# Patient Record
Sex: FEMALE | Race: WHITE | NOT HISPANIC OR LATINO | URBAN - METROPOLITAN AREA
[De-identification: names, ages, dates, MRNs, and addresses within clinical notes are randomized per-mention and may not be internally consistent; named-entity substitution may affect disease eponyms.]

---

## 2021-08-25 ENCOUNTER — TRANSFERRED RECORDS (OUTPATIENT)
Dept: HEALTH INFORMATION MANAGEMENT | Facility: CLINIC | Age: 56
End: 2021-08-25

## 2021-09-01 ENCOUNTER — TRANSFERRED RECORDS (OUTPATIENT)
Dept: HEALTH INFORMATION MANAGEMENT | Facility: CLINIC | Age: 56
End: 2021-09-01

## 2021-10-21 ENCOUNTER — TRANSFERRED RECORDS (OUTPATIENT)
Dept: HEALTH INFORMATION MANAGEMENT | Facility: CLINIC | Age: 56
End: 2021-10-21
Payer: COMMERCIAL

## 2021-10-28 ENCOUNTER — OFFICE VISIT (OUTPATIENT)
Dept: PLASTIC SURGERY | Facility: CLINIC | Age: 56
End: 2021-10-28
Payer: COMMERCIAL

## 2021-10-28 DIAGNOSIS — Z98.890 POSTOPERATIVE STATE: Primary | ICD-10-CM

## 2021-10-28 NOTE — PROGRESS NOTES
The patient's sutures were removed without difficulty. She is pleased with how her incision is healing. We reviewed post op care instructions and she purchased biocorneum to use in the future. She will follow up in 6 weeks in clinic.

## 2021-12-01 ENCOUNTER — OFFICE VISIT (OUTPATIENT)
Dept: PLASTIC SURGERY | Facility: CLINIC | Age: 56
End: 2021-12-01
Payer: COMMERCIAL

## 2021-12-01 DIAGNOSIS — Z98.890 POSTOPERATIVE STATE: Primary | ICD-10-CM

## 2021-12-01 NOTE — LETTER
12/1/2021       RE: Tracey Patrick  [address] [facility] [address]     Dear Colleague,    Thank you for referring your patient, Tracey Patrick, to the [facility]. Please see a copy of my visit note below.    This office note has been dictated.       Again, thank you for allowing me to participate in the care of your patient.      Sincerely,    RASHAD GUTIÉRREZ MD

## 2021-12-01 NOTE — LETTER
December 1, 2021  Re: Tracey Huy Patrick  1965    Dear Dr. Kelley,    Thank you so much for referring Tracey Son Marvareid to the [facility]. I had the pleasure of visiting with Tracey today.     Attached you will find a copy of my note. Please feel free to reach out to me with any questions, [phone].     This office note has been dictated.         Your trust in our practice and care is much appreciated.    Sincerely,  RASHAD GUTIÉRREZ MD

## 2021-12-01 NOTE — LETTER
12/1/2021       RE: Tracey Patrick  [address] [facility] [address]     Dear Colleague,    Thank you for referring your patient, Tracey Patrick, to the [facility] at [facility]. Please see a copy of my visit note below.    This office note has been dictated.     Service Date: 12/01/2021    REASON FOR VISIT: Tracey is back today.      PHYSICAL EXAMINATION: The area is healing quite nicely.  The everted edges stand a little bit proud, but are softening.  The incision line; however, otherwise looks good.      ASSESSMENT AND PLAN: I am going to let this mature further.  We talked about potential dermabrasion in this sebaceous area of the nose be required.  I reviewed with her the details of doing so and that it could be done as an office procedure.  For now, we are going to let the area mature.       Bob Huston MD

## 2021-12-01 NOTE — LETTER
12/1/2021       RE: Tracey Patrick  [address] [facility] [address]     Dear Colleague,    Thank you for referring your patient, Tracey Patrick, to the [facility] at [facility]. Please see a copy of my visit note below.    This office note has been dictated.     Service Date: 12/01/2021    REASON FOR VISIT: Tracey is back today.      PHYSICAL EXAMINATION: The area is healing quite nicely.  The everted edges stand a little bit proud, but are softening.  The incision line; however, otherwise looks good.      ASSESSMENT AND PLAN: I am going to let this mature further.  We talked about potential dermabrasion in this sebaceous area of the nose be required.  I reviewed with her the details of doing so and that it could be done as an office procedure.  For now, we are going to let the area mature.       Bob Huston MD

## 2021-12-03 NOTE — PROGRESS NOTES
Service Date: 2021    REASON FOR VISIT: Tracey is back today.      PHYSICAL EXAMINATION: The area is healing quite nicely.  The everted edges stand a little bit proud, but are softening.  The incision line; however, otherwise looks good.      ASSESSMENT AND PLAN: I am going to let this mature further.  We talked about potential dermabrasion in this sebaceous area of the nose be required.  I reviewed with her the details of doing so and that it could be done as an office procedure.  For now, we are going to let the area mature.       Bob Huston MD        D: 2021   T: 2021   MT: ms    Name:     TRACEY CALLOWAY  MRN:      [id number]        Account:      [id number]   :      1965           Service Date: 2021       Document: [id number]

## 2022-03-16 ENCOUNTER — OFFICE VISIT (OUTPATIENT)
Dept: PLASTIC SURGERY | Facility: CLINIC | Age: 57
End: 2022-03-16
Payer: COMMERCIAL

## 2022-03-16 DIAGNOSIS — Z98.890 POSTOPERATIVE STATE: Primary | ICD-10-CM

## 2022-03-16 NOTE — LETTER
3/16/2022       RE: Tracey Patrick  [address] [facility] [address]     Dear Colleague,    Thank you for referring your patient, Tracey Patrick, to the [facility]. Please see a copy of my visit note below.    This office note has been dictated.       Again, thank you for allowing me to participate in the care of your patient.      Sincerely,    RASHAD GUTIÉRREZ MD

## 2022-03-16 NOTE — LETTER
3/16/2022      RE: Tracey Patrick  [address] [facility] [address]       This office note has been dictated.     Service Date: 03/16/2022    Ms. Patrick is back today and would like to consider options for scar revision.  She notes some subtle depression of the scar up to the radix and a prominence at the caudal margin of the lateral leanna of the left lower lateral cartilage just lateral to the soft triangle.  There is an overlying erythematous blush in the area of surgery.      We talked about treatment options.  I could consider resurfacing of the scar at the radix and along the alar margin.  The caudal margin of the lower lateral cartilage is slightly prominent.  If the dermabrasion or resurfacing did not improve the contour in this area, I would consider an endonasal shaving or softening of that cartilage.  In fact, she has what I think is a very reasonable result.  She agrees.  From an anesthetic perspective, I think the erythema is more of a distraction than any scar contour issues.  I told her that working with Dr. Kelley for V-beam laser or any other resurfacing modalities are a consideration.  We also talked about laser resurfacing for contour improvement.  She is interested in dealing with both the erythema and the contour issues.  I would have her have the contour addressed first and then the erythema later.      She will reflect on our conversation.  This is certainly not going to be covered by her insurance.  I was candid with her in that regard.       Bob Huston M.D.           March 16, 2022      Elena Kelley M.D.   [facility]  [address] [place] [address]   [phone]      Dear Dr. Kelley,    I saw Tracey Woodard today.  She has a bit of an erythematous blush to the dorsum of her nose which is not surprising given her sun damage history.  The scar has a very subtle indentation at the radix and the left ala region.  She could  have that resurfaced.  Again, it is subtle and I think the amount of change that we can make is somewhat limited.  If she wanted to do that, I would be happy to help her.  I told her from my perspective, the bigger issue is some of the erythematous blush on the dorsum of the nose and that is an issue that she could work with you.  If she wanted to address both, I encouraged her to think of having the contour issue dealt with and then the erythema secondarily.  All in all, I think she has a very nice result with some very subtle issues that might be slightly improved.      I appreciate seeing her.  If she has further contact with the office, I will be sure to let you know.      Sincerely,        Bob Huston MD        D: 2022   T: 2022   MT: ms    Name:     VIKASH CALLOWAY  MRN:      [id number]        Account:      [id number]   :      1965           Service Date: 2022     Document: [id number]

## 2022-03-16 NOTE — LETTER
March 16, 2022  Re: Tracey Son Darnell  1965    Dear Dr. Kelley,    Thank you so much for referring Tracey Patrick to the [facility]. I had the pleasure of visiting with Tracey today.     Attached you will find a copy of my note. Please feel free to reach out to me with any questions, [phone].     This office note has been dictated.         Your trust in our practice and care is much appreciated.    Sincerely,  RASHAD GUTIÉRREZ MD

## 2022-03-16 NOTE — LETTER
3/16/2022       RE: Tracey Patrick  [address] [facility] [address]     Dear Colleague,    Thank you for referring your patient, Tracey Patrick, [facility]. Please see a copy of my visit note below.    This office note has been dictated.     Service Date: 03/16/2022    Ms. Patrick is back today and would like to consider options for scar revision.  She notes some subtle depression of the scar up to the radix and a prominence at the caudal margin of the lateral leanna of the left lower lateral cartilage just lateral to the soft triangle.  There is an overlying erythematous blush in the area of surgery.      We talked about treatment options.  I could consider resurfacing of the scar at the radix and along the alar margin.  The caudal margin of the lower lateral cartilage is slightly prominent.  If the dermabrasion or resurfacing did not improve the contour in this area, I would consider an endonasal shaving or softening of that cartilage.  In fact, she has what I think is a very reasonable result.  She agrees.  From an anesthetic perspective, I think the erythema is more of a distraction than any scar contour issues.  I told her that working with Dr. Kelley for V-beam laser or any other resurfacing modalities are a consideration.  We also talked about laser resurfacing for contour improvement.  She is interested in dealing with both the erythema and the contour issues.  I would have her have the contour addressed first and then the erythema later.      She will reflect on our conversation.  This is certainly not going to be covered by her insurance.  I was candid with her in that regard.       Bob Huston M.D.           March 16, 2022      Elena Kelley M.D.   [facility]  [address] [place] [address]   [phone]      Dear Dr. Kelley,    I saw Tracey Woodard today.   She has a bit of an erythematous blush to the dorsum of her nose which is not surprising given her sun damage history.  The scar has a very subtle indentation at the radix and the left ala region.  She could have that resurfaced.  Again, it is subtle and I think the amount of change that we can make is somewhat limited.  If she wanted to do that, I would be happy to help her.  I told her from my perspective, the bigger issue is some of the erythematous blush on the dorsum of the nose and that is an issue that she could work with you.  If she wanted to address both, I encouraged her to think of having the contour issue dealt with and then the erythema secondarily.  All in all, I think she has a very nice result with some very subtle issues that might be slightly improved.      I appreciate seeing her.  If she has further contact with the office, I will be sure to let you know.      Sincerely,        Bob Huston MD        D: 2022   T: 2022   MT: ms    Name:     VIKASH CALLOWAYWalter  MRN:      [id number]        Account:      [id number]   :      1965           Service Date: 2022       Document: [id number]

## 2022-03-17 NOTE — PROGRESS NOTES
Service Date: 03/16/2022    Ms. Patrick is back today and would like to consider options for scar revision.  She notes some subtle depression of the scar up to the radix and a prominence at the caudal margin of the lateral leanna of the left lower lateral cartilage just lateral to the soft triangle.  There is an overlying erythematous blush in the area of surgery.      We talked about treatment options.  I could consider resurfacing of the scar at the radix and along the alar margin.  The caudal margin of the lower lateral cartilage is slightly prominent.  If the dermabrasion or resurfacing did not improve the contour in this area, I would consider an endonasal shaving or softening of that cartilage.  In fact, she has what I think is a very reasonable result.  She agrees.  From an anesthetic perspective, I think the erythema is more of a distraction than any scar contour issues.  I told her that working with Dr. Kelley for V-beam laser or any other resurfacing modalities are a consideration.  We also talked about laser resurfacing for contour improvement.  She is interested in dealing with both the erythema and the contour issues.  I would have her have the contour addressed first and then the erythema later.      She will reflect on our conversation.  This is certainly not going to be covered by her insurance.  I was candid with her in that regard.       Bob Huston M.D.           March 16, 2022      Elena Kelley M.D.   [facility]  [address], [place]   [phone]      Dear Dr. Kelley,    I saw Tracey Woodard today.  She has a bit of an erythematous blush to the dorsum of her nose which is not surprising given her sun damage history.  The scar has a very subtle indentation at the radix and the left ala region.  She could have that resurfaced.  Again, it is subtle and I think the amount of change that we can make is somewhat limited.  If she  wanted to do that, I would be happy to help her.  I told her from my perspective, the bigger issue is some of the erythematous blush on the dorsum of the nose and that is an issue that she could work with you.  If she wanted to address both, I encouraged her to think of having the contour issue dealt with and then the erythema secondarily.  All in all, I think she has a very nice result with some very subtle issues that might be slightly improved.      I appreciate seeing her.  If she has further contact with the office, I will be sure to let you know.      Sincerely,        Bob Huston MD        D: 2022   T: 2022   MT: ms    Name:     VIKASH CALLOWAYWalter  MRN:      [id number]        Account:      [id number]   :      1965           Service Date: 2022       Document: [id number]

## 2023-06-08 ENCOUNTER — OFFICE VISIT (OUTPATIENT)
Dept: PLASTIC SURGERY | Facility: CLINIC | Age: 58
End: 2023-06-08

## 2023-06-08 DIAGNOSIS — C44.311 BASAL CELL CARCINOMA OF NOSE: Primary | ICD-10-CM

## 2023-06-08 NOTE — LETTER
June 8, 2023  Re: Tracey Huy Patrick  1965    Dear Dr. Kelley,    Thank you so much for referring Tracey Patrick to the [facility]. I had the pleasure of visiting with Tracey today.     Attached you will find a copy of my note. Please feel free to reach out to me with any questions, [phone].     This office note has been dictated.         Your trust in our practice and care is much appreciated.    Sincerely,  RASHAD GUTIÉRREZ MD

## 2023-06-08 NOTE — LETTER
6/8/2023       RE: Tracey Patrick  [address] [facility] [address]     Dear Colleague,    Thank you for referring your patient, Tracey Patrick, to the [facility] at [facility]. Please see a copy of my visit note below.    This office note has been dictated.       Again, thank you for allowing me to participate in the care of your patient.      Sincerely,    RASHAD GUTIÉRREZ MD

## 2023-06-11 NOTE — PROGRESS NOTES
Service Date: 2023    HISTORY OF PRESENT ILLNESS: Vikash is in today.  We reviewed with her an area for dermabrasion.  We marked it and reviewed it with a mirror.      PROCEDURE: I injected straight Xylocaine and then Xylocaine with epinephrine.  We carried out andry fraise dermabrasion after painting the area with gentian violet.  The depth was dictated by the punctate bleeding of the papillary dermis.  Antibiotic ointment and non-adhesive bandage was applied.  Home cares were discussed.      ASSESSMENT AND PLAN: She will check in with Katarzyna on Monday and I will see her in a couple of weeks.      Bob Huston MD        D: 2023   T: 2023   MT: ms    Name:     VIKASH CALLOWAY  MRN:      [id number]        Account:      [id number]   :      1965           Service Date: 2023       Document: [id number]

## 2023-06-16 ENCOUNTER — TELEPHONE (OUTPATIENT)
Dept: PLASTIC SURGERY | Facility: CLINIC | Age: 58
End: 2023-06-16

## 2024-09-09 NOTE — PROGRESS NOTES
Facial Plastic and Reconstructive Surgery Cosmetic Consultation  09/10/24       HPI:   I had the pleasure of seeing Tracey Patrick today in clinic for consultation for surgical facial rejuvenation.     Tracey Patrick is a 59 year old female. She was in [place] last week and she fell. She had sutures placed there.  She reports that she fell onto her elbow and right brow onto some bricks.  There was a lot of bleeding.  She did not look at it before it was closed.  The provider that repaired the laceration told her that her brow hair should grow back.    PMHx: none  Meds :estrogen, progesterone  Allergies: NKDA  SocHx: never smoker  SHx: skin cancer       PE:  Alert and Oriented, Answering Questions Appropriately  Atraumatic, Normocephalic, Face Symmetric  Skin: So 2  Facial Nerve Intact and facial movement symmetric  She has a laceration in the region of the right tail of the brow. It is about 2cm in length. Some expected erythema. There is some alopecia in this area. There is no distortion of the lid or surrounding structures to indicate any significant amount of tissue loss.  Sutures were removed without difficulty.            IMPRESSION/PLAN: Tracey Patrick is a 59 year old female here today in consult.  She fell last week in [place] suffering a laceration to her right lateral brow.  We removed the sutures for her today.  I discussed wound care with her including liberally applying Aquaphor ointment for the next 3 weeks along with cleaning with half-strength peroxide.  In 3 weeks she can start silicone scar gel.  We also discussed silicone sheets.  I would like to see her back in about 1 month to make sure things are healing well.  I discussed that if it was just an abrasion with a cut I would anticipate that the hair follicles are still present and the hair should grow back.  This can take some time however and therefore we will just have to wait and see.  We briefly discussed  options for tattooing and hair transplant if the hair does not grow back.    Photodocumentation was obtained.     Risks and benefits of the procedure were discussed, including but not limited to motor/sensory nerve damage (temporary and permanent), scarring, hematoma, irregularities of skin and underlying soft tissue, infection, asymmetry, and the need for additional procedures.

## 2024-09-10 ENCOUNTER — OFFICE VISIT (OUTPATIENT)
Dept: PLASTIC SURGERY | Facility: CLINIC | Age: 59
End: 2024-09-10

## 2024-09-10 DIAGNOSIS — Z41.1 ENCOUNTER FOR COSMETIC PROCEDURE: Primary | ICD-10-CM

## 2024-09-12 RX ORDER — ESTRADIOL 0.04 MG/D
1 PATCH, EXTENDED RELEASE TRANSDERMAL
COMMUNITY
Start: 2023-03-14

## 2024-09-12 RX ORDER — PROGESTERONE 100 MG/1
100 CAPSULE ORAL DAILY
COMMUNITY

## 2024-09-12 NOTE — NURSING NOTE
Photo documentation obtained.    Sutures removed from R eyebrow put in by an outside provider.     Pt instruction to keep a sheen of Aquaphor over incision for the next 3 weeks, then switch to silicone and SPF.     Leah Mackay RN  09/12/24 10:13 AM

## 2024-10-08 ENCOUNTER — OFFICE VISIT (OUTPATIENT)
Dept: PLASTIC SURGERY | Facility: CLINIC | Age: 59
End: 2024-10-08

## 2024-10-08 DIAGNOSIS — Z41.1 ENCOUNTER FOR COSMETIC PROCEDURE: Primary | ICD-10-CM

## 2024-10-08 NOTE — PROGRESS NOTES
Facial Plastic and Reconstructive Surgery      Tracey Patrick is here today in follow up. Recall, she fell at the beginning of September in [place] and suffered a right brow laceration.     Today, she reports, she is doing well. She is using silicone. The hair has started to grow back.     On exam, the laceration is healing nicely. There is some firmness and fullness around the laceration, which is expected. Brow hair is growing back nicely.     A/P: She is doing well. I want her to start massage and continue scar gel.   I would like to see her back in about 3-4 months, sooner if there are any issues.  We may do some steroid injection at the next visit.      Donna Cavanaugh MD